# Patient Record
Sex: MALE | Race: WHITE | Employment: OTHER | URBAN - METROPOLITAN AREA
[De-identification: names, ages, dates, MRNs, and addresses within clinical notes are randomized per-mention and may not be internally consistent; named-entity substitution may affect disease eponyms.]

---

## 2017-07-28 ENCOUNTER — GENERIC CONVERSION - ENCOUNTER (OUTPATIENT)
Dept: OTHER | Facility: OTHER | Age: 72
End: 2017-07-28

## 2017-08-03 ENCOUNTER — ALLSCRIPTS OFFICE VISIT (OUTPATIENT)
Dept: RADIOLOGY | Facility: CLINIC | Age: 72
End: 2017-08-03
Payer: MEDICARE

## 2017-08-07 ENCOUNTER — GENERIC CONVERSION - ENCOUNTER (OUTPATIENT)
Dept: OTHER | Facility: OTHER | Age: 72
End: 2017-08-07

## 2017-09-26 ENCOUNTER — GENERIC CONVERSION - ENCOUNTER (OUTPATIENT)
Dept: OTHER | Facility: OTHER | Age: 72
End: 2017-09-26

## 2017-10-05 ENCOUNTER — ALLSCRIPTS OFFICE VISIT (OUTPATIENT)
Dept: RADIOLOGY | Facility: CLINIC | Age: 72
End: 2017-10-05
Payer: MEDICARE

## 2017-10-05 ENCOUNTER — APPOINTMENT (OUTPATIENT)
Dept: RADIOLOGY | Facility: CLINIC | Age: 72
End: 2017-10-05
Payer: MEDICARE

## 2017-10-05 DIAGNOSIS — M54.50 LOW BACK PAIN: ICD-10-CM

## 2017-10-05 DIAGNOSIS — M79.604 PAIN OF RIGHT LEG: ICD-10-CM

## 2017-10-05 DIAGNOSIS — M79.605 PAIN OF LEFT LEG: ICD-10-CM

## 2017-10-05 PROCEDURE — 73503 X-RAY EXAM HIP UNI 4/> VIEWS: CPT

## 2017-10-05 PROCEDURE — 72110 X-RAY EXAM L-2 SPINE 4/>VWS: CPT

## 2017-11-09 ENCOUNTER — GENERIC CONVERSION - ENCOUNTER (OUTPATIENT)
Dept: OTHER | Facility: OTHER | Age: 72
End: 2017-11-09

## 2017-11-09 ENCOUNTER — APPOINTMENT (OUTPATIENT)
Dept: RADIOLOGY | Facility: CLINIC | Age: 72
End: 2017-11-09
Payer: MEDICARE

## 2017-11-09 DIAGNOSIS — M54.50 LOW BACK PAIN: ICD-10-CM

## 2017-11-09 PROCEDURE — 72114 X-RAY EXAM L-S SPINE BENDING: CPT

## 2017-11-27 ENCOUNTER — GENERIC CONVERSION - ENCOUNTER (OUTPATIENT)
Dept: OTHER | Facility: OTHER | Age: 72
End: 2017-11-27

## 2017-12-06 ENCOUNTER — GENERIC CONVERSION - ENCOUNTER (OUTPATIENT)
Dept: OTHER | Facility: OTHER | Age: 72
End: 2017-12-06

## 2017-12-08 ENCOUNTER — GENERIC CONVERSION - ENCOUNTER (OUTPATIENT)
Dept: PAIN MEDICINE | Facility: CLINIC | Age: 72
End: 2017-12-08

## 2017-12-13 ENCOUNTER — GENERIC CONVERSION - ENCOUNTER (OUTPATIENT)
Dept: OTHER | Facility: OTHER | Age: 72
End: 2017-12-13

## 2017-12-14 ENCOUNTER — GENERIC CONVERSION - ENCOUNTER (OUTPATIENT)
Dept: OTHER | Facility: OTHER | Age: 72
End: 2017-12-14

## 2017-12-21 ENCOUNTER — ALLSCRIPTS OFFICE VISIT (OUTPATIENT)
Dept: RADIOLOGY | Facility: CLINIC | Age: 72
End: 2017-12-21
Payer: MEDICARE

## 2017-12-28 ENCOUNTER — GENERIC CONVERSION - ENCOUNTER (OUTPATIENT)
Dept: OTHER | Facility: OTHER | Age: 72
End: 2017-12-28

## 2018-01-10 NOTE — MISCELLANEOUS
Message   Recorded as Task   Date: 08/07/2017 09:07 AM, Created By: Freeman García   Task Name: Follow Up   Assigned To: [facility] [place] [facility]   Regarding Patient: Dexter Patel, Status: Active   Comment:    Marck Gaytan - 07 Aug 2017 9:07 AM     TASK CREATED  s/p LESI - asa plavix on 8/3  Per SL, repeat procedure in ~ 2 weeks  Fu w/ DG 2 weeks later  Anticoag hold signed by Dr Albert Patten on 7/27  ***Call Dr Albert Patten to confirm - ok for repeat anticoag hold   Chastity Milian - 07 Aug 2017 12:21 PM     TASK EDITED  Pt called requesting form to be faxed to Dr Mayi Coehn office  He would like injection to be scheduled before Dr Juanis Jim is on vacation  Pls call pt on cell # [phone]  Marck Gaytan - 07 Aug 2017 2:56 PM     TASK EDITED  s/w pt, requesting an injecton on 8/17, "before Dr Juanis Jim goes on vacation " Advised pt, Dr Juanis Jim does not have hours 8/14 - 8/21  Advised pt, procedure on 8/3 does not allow enough time to resume and hold plavix again for an injection before Dr Juanis Jim goes on vacation on 8/14  Pt verbalized understanding and appreciation for the call  Active Problems    1  Carpal tunnel syndrome (354 0) (G56 00)   2  Chronic lumbar pain (724 2,338 29) (M54 5,G89 29)   3  Chronic lumbar radiculopathy (724 4) (M54 16)   4  Leg pain, bilateral (729 5) (M79 604,M79 605)   5  Lumbar herniated disc (722 10) (M51 26)   6  Lumbar spondylosis (721 3) (M47 816)   7  Pain syndrome, chronic (338 4) (G89 4)   8  Polymyalgia rheumatica (725) (M35 3)   9  Wrist pain, right (719 43) (M25 531)    Current Meds   1  Aleve CAPS; Therapy: (Recorded:94Mve5607) to Recorded   2  Aspir-81 81 MG Oral Tablet Delayed Release Recorded   3  Atenolol 50 MG Oral Tablet; Therapy: (Recorded:46Pvl5498) to Recorded   4  Calcium 250 MG Oral Capsule; Therapy: 72SRC4223 to Recorded   5  Co Q10 CAPS Recorded   6  Fluticasone Propionate 50 MCG/ACT Nasal Suspension;    Therapy: 13DOF3794 to (Evaluate:42Wlr3676) Recorded   7  Folic Acid 1 MG Oral Tablet Recorded   8  Gabapentin 100 MG Oral Capsule; Take 1 po tid x 7 days, then 2 po tid x 7 days, then   3 po tid; Therapy: 86DLZ0843 to (Evaluate:28Oct2016)  Requested for: 10YPH4174; Last   Rx:28Sep2016 Ordered   9  Hydrocodone-Acetaminophen 7 5-325 MG Oral Tablet; TAKE 1 TABLET TWICE DAILY   AS NEEDED FOR PAIN;   Therapy: 15AFT1963 to (Evaluate:01Oct2015); Last Rx:01Sep2015 Ordered   10  Livalo 4 MG Oral Tablet; Therapy: (Recorded:81Kcw3796) to Recorded   11  Magnesium 200 MG Oral Tablet; Therapy: 91HYO3273 to Recorded   12  Multi-Vitamin/Iron Oral Tablet; Therapy: 50IAY2757 to Recorded   13  Omeprazole 20 MG Oral Capsule Delayed Release; Therapy: 49HIU3656 to (566 324 313) Recorded   14  Plavix 75 MG Oral Tablet (Clopidogrel Bisulfate) Recorded   15  Ramipril 5 MG Oral Capsule; Therapy: 45ASY4689 to (566 324 313) Recorded   16  RaNITidine HCl - 300 MG Oral Capsule; Therapy: (Recorded:24Bwg5860) to Recorded   17  Synthroid 150 MCG Oral Tablet (Levothyroxine Sodium); Therapy: (Recorded:95Sbc0713) to Recorded   18  Tylenol 325 MG Oral Tablet; Therapy: (Recorded:33Ebg0217) to Recorded   19  Viagra 50 MG Oral Tablet; Therapy: 84Jwk2435 to (Evaluate:47Iuz6902) Recorded   20  Zinc 100 MG Oral Tablet; Therapy: 13RCY1069 to Recorded    Allergies    1  Cardizem TABS   2   Niacin TABS   3  Statins    Signatures   Electronically signed by : Jonathon Franz, ; Aug  7 2017  2:56PM EST                       (Author)

## 2018-01-11 NOTE — MISCELLANEOUS
Message   Recorded as Task   Date: 09/21/2017 01:45 PM, Created By: Marylen Bongo   Task Name: Care Coordination   Assigned To: [facility] [place] [facility]   Regarding Patient: Dontae Luis, Status: Active   Comment:    MartinezLuz rodriguez - 21 Sep 2017 1:45 PM     TASK CREATED  faxed new hold form, pt back from vac    Dr South Amin (plavix & asa)    schedule for Marck Winn - 26 Sep 2017 10:03 AM     TASK EDITED  Received ok to hold plavis x 7 days prior to procedure and asa x 6 days prior to procedure signed by Dr South Amin 9/25/17  Marck Gaytan - 26 Sep 2017 10:52 AM     TASK EDITED  s/w pt, advised of above  Scheduled LESI - Plavix & ASA on 10/5  Reviewed pre procedure instructions: eat a light meal - npo 1 hour prior, , loose fitting clothing, cb if illness or abx start prior to procedure, no asa 9/29 - 10/5 post procedure, no plavix 9/28 - 10/5 post procedure  Pt states he takes occassional aleve  Advised pt, no NSAIDS 10/1 - 10/5 post procedure, tylenol is ok  ***Pt states he is currently at the "tail end" of bronchitis  Often gets bronchitis w/ the change of seasons  No abx, confirmed short course of oral steroids  Advised pt, he should be asymptomatic prior to procedure; no fever, productive cough, discolored mucous  Will discuss oral steroid w/ SL - anticipate it will not be an issue as it should be completed prior to procedure  pt verbalized understanding and appreciation  Hany Nciholson - 26 Sep 2017 11:47 AM     TASK REPLIED TO: Previously Assigned To Hany Nicholson and aware        Active Problems    1  Carpal tunnel syndrome (354 0) (G56 00)   2  Chronic lumbar pain (724 2,338 29) (M54 5,G89 29)   3  Chronic lumbar radiculopathy (724 4) (M54 16)   4  Leg pain, bilateral (729 5) (M79 604,M79 605)   5  Lumbar herniated disc (722 10) (M51 26)   6  Lumbar spondylosis (721 3) (M47 816)   7  Pain syndrome, chronic (338 4) (G89 4)   8  Polymyalgia rheumatica (725) (M35 3)   9   Wrist pain, right (619 43) (L66 821)    Current Meds   1  Aleve CAPS; Therapy: (Recorded:10Gvx4429) to Recorded   2  Aspir-81 81 MG Oral Tablet Delayed Release Recorded   3  Atenolol 50 MG Oral Tablet; Therapy: (Recorded:19May2015) to Recorded   4  Calcium 250 MG Oral Capsule; Therapy: 61EZK3990 to Recorded   5  Co Q10 CAPS Recorded   6  Fluticasone Propionate 50 MCG/ACT Nasal Suspension; Therapy: 74LPT9059 to (Evaluate:05Aug2015) Recorded   7  Folic Acid 1 MG Oral Tablet Recorded   8  Gabapentin 100 MG Oral Capsule; Take 1 po tid x 7 days, then 2 po tid x 7 days, then   3 po tid; Therapy: 24ZSB8884 to (Evaluate:28Oct2016)  Requested for: 08GXX9993; Last   Rx:28Sep2016 Ordered   9  Hydrocodone-Acetaminophen 7 5-325 MG Oral Tablet; TAKE 1 TABLET TWICE DAILY   AS NEEDED FOR PAIN;   Therapy: 64IAS9988 to (Evaluate:01Oct2015); Last Rx:01Sep2015 Ordered   10  Livalo 4 MG Oral Tablet; Therapy: (Recorded:19May2015) to Recorded   11  Magnesium 200 MG Oral Tablet; Therapy: 65CHB3800 to Recorded   12  Multi-Vitamin/Iron Oral Tablet; Therapy: 60SXS9227 to Recorded   13  Omeprazole 20 MG Oral Capsule Delayed Release; Therapy: 16NAN1572 to (77 873 135) Recorded   14  Plavix 75 MG Oral Tablet (Clopidogrel Bisulfate) Recorded   15  Ramipril 5 MG Oral Capsule; Therapy: 45AGH6202 to (77 873 135) Recorded   16  RaNITidine HCl - 300 MG Oral Capsule; Therapy: (Recorded:19May2015) to Recorded   17  Synthroid 150 MCG Oral Tablet (Levothyroxine Sodium); Therapy: (Recorded:19May2015) to Recorded   18  Tylenol 325 MG Oral Tablet; Therapy: (Recorded:19May2015) to Recorded   19  Viagra 50 MG Oral Tablet; Therapy: 38Lox6461 to (Evaluate:05Aug2015) Recorded   20  Zinc 100 MG Oral Tablet; Therapy: 89HPI0485 to Recorded    Allergies    1  Cardizem TABS   2   Niacin TABS   3  Statins    Signatures   Electronically signed by : Chiki Suazo, ; Sep 26 2017 12:06PM EST                       (Author)

## 2018-01-11 NOTE — MISCELLANEOUS
Message  Patient called requesting MR for 11/9 Xray  Mailed report to patient      Active Problems    1  Carpal tunnel syndrome (354 0) (G56 00)   2  Chronic lumbar pain (724 2,338 29) (M54 5,G89 29)   3  Chronic lumbar radiculopathy (724 4) (M54 16)   4  Leg pain, bilateral (729 5) (M79 604,M79 605)   5  Lumbar herniated disc (722 10) (M51 26)   6  Lumbar spondylosis (721 3) (M47 816)   7  Pain syndrome, chronic (338 4) (G89 4)   8  Polymyalgia rheumatica (725) (M35 3)   9  Wrist pain, right (719 43) (M25 531)    Current Meds   1  Aleve CAPS; Therapy: (Recorded:19May2015) to Recorded   2  Aspir-81 81 MG Oral Tablet Delayed Release Recorded   3  Atenolol 50 MG Oral Tablet; Therapy: (Recorded:19May2015) to Recorded   4  Calcium 250 MG Oral Capsule; Therapy: 04LAJ5103 to Recorded   5  Co Q10 CAPS Recorded   6  Fluticasone Propionate 50 MCG/ACT Nasal Suspension; Therapy: 33KKN7014 to (Evaluate:31Fth7857) Recorded   7  Folic Acid 1 MG Oral Tablet Recorded   8  Livalo 4 MG Oral Tablet; Therapy: (Recorded:19May2015) to Recorded   9  Magnesium 200 MG Oral Tablet; Therapy: 06VIA0912 to Recorded   10  Multi-Vitamin/Iron Oral Tablet; Therapy: 40CNQ8070 to Recorded   11  Omeprazole 20 MG Oral Capsule Delayed Release; Therapy: 66XVD0692 to (566 324 313) Recorded   12  Plavix 75 MG Oral Tablet (Clopidogrel Bisulfate) Recorded   13  Ramipril 5 MG Oral Capsule; Therapy: 05PBC2763 to (566 324 313) Recorded   14  RaNITidine HCl - 300 MG Oral Capsule; Therapy: (Recorded:19May2015) to Recorded   15  Synthroid 150 MCG Oral Tablet (Levothyroxine Sodium); Therapy: (Recorded:19May2015) to Recorded   16  Tylenol 325 MG Oral Tablet; Therapy: (Recorded:19May2015) to Recorded   17  Viagra 50 MG Oral Tablet; Therapy: 84Swp2163 to (Evaluate:88Mhv8945) Recorded   18  Zinc 100 MG Oral Tablet; Therapy: 25QSX3643 to Recorded    Allergies    1  Cardizem TABS   2  Niacin TABS   3  Statins    Signatures   Electronically signed by : Lupis Mi, ; Nov 27 2017 10:52AM EST                       (Author)

## 2018-01-12 NOTE — MISCELLANEOUS
Message   Recorded as Task   Date: 11/08/2016 09:59 AM, Created By: Bhavin John   Task Name: Miscellaneous   Assigned To: Bhavin John   Regarding Patient: Ruddy Etienne, Status: Active   CommentToma Lopeno - 08 Nov 2016 9:59 AM     TASK CREATED  Patient missed his appt  11/8/16 @ 9:15 AM with Barrie  I called the patient & he stated that he cancelled this appt  He gave no reason for the appt  & did not reschedule  Barrie Ramirez - 08 Nov 2016 10:26 AM     TASK REPLIED TO: Previously Assigned To Barrie Ramirez  Provider aware  Thank you  Active Problems    1  Carpal tunnel syndrome (354 0) (G56 00)   2  Chronic lumbar pain (724 2,338 29) (M54 5,G89 29)   3  Chronic lumbar radiculopathy (724 4) (M54 16)   4  Leg pain, bilateral (729 5) (M79 604,M79 605)   5  Lumbar herniated disc (722 10) (M51 26)   6  Lumbar spondylosis (721 3) (M47 816)   7  Pain syndrome, chronic (338 4) (G89 4)   8  Polymyalgia rheumatica (725) (M35 3)   9  Wrist pain, right (719 43) (M25 531)    Current Meds   1  Aleve CAPS; Therapy: (Recorded:19May2015) to Recorded   2  Aspir-81 81 MG Oral Tablet Delayed Release Recorded   3  Atenolol 50 MG Oral Tablet; Therapy: (Recorded:18Asq1358) to Recorded   4  Co Q10 CAPS Recorded   5  Fluticasone Propionate 50 MCG/ACT Nasal Suspension; Therapy: 02DWG5002 to (Evaluate:13Rht2967) Recorded   6  Folic Acid 1 MG Oral Tablet Recorded   7  Gabapentin 100 MG Oral Capsule; Take 1 po tid x 7 days, then 2 po tid x 7 days, then   3 po tid; Therapy: 55RNI6319 to (Evaluate:28Oct2016)  Requested for: 86NQM0200; Last   Rx:28Sep2016 Ordered   8  Hydrocodone-Acetaminophen 7 5-325 MG Oral Tablet; TAKE 1 TABLET TWICE DAILY   AS NEEDED FOR PAIN;   Therapy: 43REQ5079 to (Evaluate:01Oct2015); Last Rx:01Sep2015 Ordered   9  Livalo 4 MG Oral Tablet; Therapy: (Recorded:85Vrh8490) to Recorded   10  Methotrexate 2 5 MG Oral Tablet Recorded   11  Omeprazole 20 MG Oral Capsule Delayed Release;     Therapy: 60ZLT4771 to (05 12 73 93 30) Recorded   12  Plavix 75 MG Oral Tablet (Clopidogrel Bisulfate) Recorded   13  PredniSONE 1 MG Oral Tablet Recorded   14  Ramipril 5 MG Oral Capsule; Therapy: 52RMP1855 to (05 12 73 93 30) Recorded   15  RaNITidine HCl - 300 MG Oral Capsule; Therapy: (Recorded:79Msz3941) to Recorded   16  Synthroid 150 MCG Oral Tablet (Levothyroxine Sodium); Therapy: (Recorded:11Prj0493) to Recorded   17  Tylenol 325 MG Oral Tablet; Therapy: (Recorded:34Dwo3641) to Recorded   18  Viagra 50 MG Oral Tablet; Therapy: 62Glb0678 to (Evaluate:30Rwd7406) Recorded    Allergies    1  Cardizem TABS   2  Niacin TABS   3  Statins    Signatures   Electronically signed by :  Penny Beard, ; Nov 8 2016 10:53AM EST                       (Author)

## 2018-01-17 NOTE — RESULT NOTES
Message   Recorded as Task   Date: 10/06/2017 02:17 PM, Created By: [facility]   Task Name: Follow Up   Assigned To: [facility]   Regarding Patient: Xi Alonso, Status: Active   CommentVelia Plane - 06 Oct 2017 2:17 PM     TASK CREATED  S/P LESI on 10/05/2017 w/[facility] [place]  4W F/U AFTER INJ & MRI scheduled on 11/03/2017    Please call on 10/12/2017   Margarita Flores - 17 Oct 2017 9:21 AM     TASK EDITED  spoke to pt he states he received 50% relief from inj and current pain is 4/10   Hany Nicholson - 17 Oct 2017 7:16 PM     TASK REPLIED TO: Previously Assigned To Hany Nicholson  aware

## 2018-01-17 NOTE — MISCELLANEOUS
Message   Recorded as Task   Date: 10/31/2016 03:57 PM, Created By: Eugenia Brewster   Task Name: Miscellaneous   Assigned To: Eugenia Brewsetr   Regarding Patient: Suzy Mcmillan, Status: Active   CommentEarna Prim - 31 Oct 2016 3:57 PM     TASK CREATED  Patient's wife called to cancel his appt/ 11/8/16 @ 9:15 AM with Barrie  She gave no reason for the cancellation, but stated that she would call back to Barrie Kate - 31 Oct 2016 4:20 PM     TASK REPLIED TO: Previously Assigned To Barrie Ramirez  Provide aware  Thank you  Active Problems    1  Carpal tunnel syndrome (354 0) (G56 00)   2  Chronic lumbar pain (724 2,338 29) (M54 5,G89 29)   3  Chronic lumbar radiculopathy (724 4) (M54 16)   4  Leg pain, bilateral (729 5) (M79 604,M79 605)   5  Lumbar herniated disc (722 10) (M51 26)   6  Lumbar spondylosis (721 3) (M47 816)   7  Pain syndrome, chronic (338 4) (G89 4)   8  Polymyalgia rheumatica (725) (M35 3)   9  Wrist pain, right (719 43) (M25 531)    Current Meds   1  Aleve CAPS; Therapy: (Recorded:24Xoc7362) to Recorded   2  Aspir-81 81 MG Oral Tablet Delayed Release Recorded   3  Atenolol 50 MG Oral Tablet; Therapy: (Recorded:19Jrq3937) to Recorded   4  Co Q10 CAPS Recorded   5  Fluticasone Propionate 50 MCG/ACT Nasal Suspension; Therapy: 14IHC1498 to (Evaluate:81Hzp7733) Recorded   6  Folic Acid 1 MG Oral Tablet Recorded   7  Gabapentin 100 MG Oral Capsule; Take 1 po tid x 7 days, then 2 po tid x 7 days, then   3 po tid; Therapy: 09PDP7226 to (Evaluate:28Oct2016)  Requested for: 92PTK8516; Last   Rx:28Sep2016 Ordered   8  Hydrocodone-Acetaminophen 7 5-325 MG Oral Tablet; TAKE 1 TABLET TWICE DAILY   AS NEEDED FOR PAIN;   Therapy: 29QCU3677 to (Evaluate:01Oct2015); Last Rx:01Sep2015 Ordered   9  Livalo 4 MG Oral Tablet; Therapy: (Recorded:28Ctz6461) to Recorded   10  Methotrexate 2 5 MG Oral Tablet Recorded   11  Omeprazole 20 MG Oral Capsule Delayed Release;     Therapy: 65BMW1770 to ([phone]) Recorded   12  Plavix 75 MG Oral Tablet (Clopidogrel Bisulfate) Recorded   13  PredniSONE 1 MG Oral Tablet Recorded   14  Ramipril 5 MG Oral Capsule; Therapy: 64ZHN7092 to (229 1574 1026) Recorded   15  RaNITidine HCl - 300 MG Oral Capsule; Therapy: (Recorded:20Jtu7076) to Recorded   16  Synthroid 150 MCG Oral Tablet (Levothyroxine Sodium); Therapy: (Recorded:62Xbh0734) to Recorded   17  Tylenol 325 MG Oral Tablet; Therapy: (Recorded:96Cur3385) to Recorded   18  Viagra 50 MG Oral Tablet; Therapy: 03Qrn7313 to (Evaluate:79Eab3474) Recorded    Allergies    1  Cardizem TABS   2  Niacin TABS   3  Statins    Signatures   Electronically signed by :  Elizabeth Walker, ; Nov 1 2016  8:12AM EST                       (Author)

## 2018-01-18 NOTE — MISCELLANEOUS
Message   Recorded as Task   Date: 07/18/2017 04:29 PM, Created By: [address]   Task Name: Care Coordination   Assigned To: [facility] [place] [facility]   Regarding Patient: Caio Spring, Status: Active   Comment:    Marck Gaytan - 18 Jul 2017 4:29 PM     TASK CREATED  Per clerical staff, pt presented at the office today  states he felt good in june, came home from [place] and did some things at home and is now having pain in low back into b/l legs  S/w pt, Stated that he discussed injections w/ SL last year  Coudl not hold plavix at that time  Pt states that he has seen Dr Norm Ramos since then  ok to hold plavix for short periods  Pt states he has seen sports medicine in [place] for knee and shoulder pain  Had stem cell inj in knee w/ + relief  Per pt, Dr Rosales Andres in [place]  Pt also sees a pcp in [place], Dr Nicola Schwab  No changes in his health - basal cell removal      Updated pt's medication list  Per pt, stopped gabapentin  Has not needed hydrocodone acetaminophen  Pt stated that he has not had any issues w/ back pain  Came back to [place]  Feels he aggrivated his back pain w/ heavy lifting and working w/ machinery; tractors, mowers, etc  Pt c/o b/l low back pain and b/l posterior thigh pain possibly worse in R leg  Advised pt, will d/w Dr Cherelle Denney and cb to advise  Per GRISELDA WARD Manette - 18 Jul 2017 4:38 PM     TASK EDITED  order faxed to Dr Norm Ramos   ** Call pt to advise of Marck Heath - 19 Jul 2017 9:49 AM     TASK EDITED  Order refaxed to Dr Norm Ramos 7/19  Confirmation received  S/w pt, advised of above  Pt is aware, this office will cb when a response from Dr Norm Ramos is received  Laura Franz - 26 Jul 2017 11:55 AM     TASK EDITED  [facility]- patient called back stating he called Dr Darryl Rodarte office and they have yet to get the form/request to hold his Plavix   stated to fax form to [phone] Attn: Candice Sandoval   patient stated Dr Norm Ramos wants Dr Cherelle Denney to specify how many days he is requesting to have patient off of the Plavix   any questions to c/b [phone] (cell)   Marck Gaytan - 26 Jul 2017 2:08 PM     TASK EDITED  Refaxed anticoag form to Dr Sheryl Holcomb office  S/w Sabina in the PT room  advised of above  Per Shawna Rider, Dr Jesús De Anda will be in the office all day tomorrow, anticipate a reply at that time  S/w pt, advised of above  pt verbalized udnerstanding and appreciation  Will wait for our call  Katerin Melvin - 27 Jul 2017 1:24 PM     TASK EDITED  Shawna Rider retruning call and said that Ponce Valle signed the paper and she will be faxing it over so to be on the look out for it  any questions you can call her back or if you dont recieve it   Chastity Milian - 27 Jul 2017 2:51 PM     TASK EDITED  Pt called stating he talked to Dr Sheryl Holcomb office and they faxed the form for the OK for him to get inj  Pls call pt on cell # [phone]  Marck Gaytan - 27 Jul 2017 2:55 PM     TASK EDITED  Received ok to hold plavix x 7 days, asa x 6 days prior to procedure signed by Dr Jesús De Anda on 7/27   Marck Gaytan - 28 Jul 2017 10:33 AM     TASK EDITED  s/w pt, advised of above  pt confirmed last plavix on 7/27/2017  Scheduled LESI on 8/3  Reviewd pre procedure instructions; eat a light meal - npo 1 hour prior, , loose fitting clothing, cb if illness /abx start, no plavix 7/28 - 8/3 post procedure, no asa 7/29 - 8/3 post procedure  pt verbalized understanding and appreciation  Will cb prn  Active Problems    1  Carpal tunnel syndrome (354 0) (G56 00)   2  Chronic lumbar pain (724 2,338 29) (M54 5,G89 29)   3  Chronic lumbar radiculopathy (724 4) (M54 16)   4  Leg pain, bilateral (729 5) (M79 604,M79 605)   5  Lumbar herniated disc (722 10) (M51 26)   6  Lumbar spondylosis (721 3) (M47 816)   7  Pain syndrome, chronic (338 4) (G89 4)   8  Polymyalgia rheumatica (725) (M35 3)   9  Wrist pain, right (670 43) (M20 531)    Current Meds   1  Aleve CAPS;    Therapy: (Recorded:70Arn7981) to Recorded   2  Aspir-81 81 MG Oral Tablet Delayed Release Recorded   3  Atenolol 50 MG Oral Tablet; Therapy: (Recorded:93Tzc3186) to Recorded   4  Calcium 250 MG Oral Capsule; Therapy: 45BNI8666 to Recorded   5  Co Q10 CAPS Recorded   6  Fluticasone Propionate 50 MCG/ACT Nasal Suspension; Therapy: 36WSF6401 to (Evaluate:05Aug2015) Recorded   7  Folic Acid 1 MG Oral Tablet Recorded   8  Gabapentin 100 MG Oral Capsule; Take 1 po tid x 7 days, then 2 po tid x 7 days, then   3 po tid; Therapy: 45JAQ5974 to (Evaluate:28Oct2016)  Requested for: 94FDL3848; Last   Rx:28Sep2016 Ordered   9  Hydrocodone-Acetaminophen 7 5-325 MG Oral Tablet; TAKE 1 TABLET TWICE DAILY   AS NEEDED FOR PAIN;   Therapy: 91YGT1679 to (Evaluate:01Oct2015); Last Rx:01Sep2015 Ordered   10  Livalo 4 MG Oral Tablet; Therapy: (Recorded:19May2015) to Recorded   11  Magnesium 200 MG Oral Tablet; Therapy: 41UNH3618 to Recorded   12  Multi-Vitamin/Iron Oral Tablet; Therapy: 39AGJ5139 to Recorded   13  Omeprazole 20 MG Oral Capsule Delayed Release; Therapy: 65TUM2332 to ([phone]) Recorded   14  Plavix 75 MG Oral Tablet (Clopidogrel Bisulfate) Recorded   15  Ramipril 5 MG Oral Capsule; Therapy: 05JBP1880 to ([id number]) Recorded   16  RaNITidine HCl - 300 MG Oral Capsule; Therapy: (Recorded:25Ecw2441) to Recorded   17  Synthroid 150 MCG Oral Tablet (Levothyroxine Sodium); Therapy: (Recorded:40Ele9251) to Recorded   18  Tylenol 325 MG Oral Tablet; Therapy: (Recorded:59Cbc0333) to Recorded   19  Viagra 50 MG Oral Tablet; Therapy: 33Vhl1031 to (Evaluate:05Aug2015) Recorded   20  Zinc 100 MG Oral Tablet; Therapy: 62ZRR7820 to Recorded    Allergies    1  Cardizem TABS   2   Niacin TABS   3  Statins    Signatures   Electronically signed by : Drea Walker, ; Jul 28 2017 10:33AM EST                       (Author)

## 2018-01-22 VITALS
HEIGHT: 71 IN | BODY MASS INDEX: 28.7 KG/M2 | SYSTOLIC BLOOD PRESSURE: 120 MMHG | WEIGHT: 205 LBS | HEART RATE: 80 BPM | DIASTOLIC BLOOD PRESSURE: 64 MMHG | TEMPERATURE: 97.7 F

## 2018-01-23 NOTE — MISCELLANEOUS
Message   Recorded as Task   Date: 12/08/2017 09:29 AM, Created By: Myra Duval   Task Name: Hospital Call   Assigned To: [facility] [place] [facility]   Regarding Patient: Brando Oh, Status: Active   CommentChanning Gala - 08 Dec 2017 9:29 AM     TASK CREATED  Caller: Shaun Mckeon - ENDO ; Hospital Call; [phone]  Received a call from Shaun Mckeon RN from the Endo center  She just wanted to inform you per Dr Saige Hassan and Dr Sanches Counts that there is a high chance of cross contamination and the pt  is at a great risk of getting transient bacteremia  She stated the pt  is dehydrated d/t a bowel cleansing and they recommend to SL not have an the epidural done today  SL made aware and per Calvin Sanchez for today cancelled and pt  to be notified on monday to possibly be rescheduled after f/u c/ Dr Saieg Hassan as to when he can be cleared and to check date on consent for plavix hold  Attempted to call pt's cell phone on file and left a detailed mom in regards to cancelling opro for today and restarting plavix  Luz aware also  Thanks Melissa Soliman - 08 Dec 2017 9:42 AM     TASK EDITED  S/w Shaun Mckeon at [phone] to verify risk of contamination  She s/w Dr Federica Rowley at the [facility] and he stated he would not do the epidural for 24 hrs  The pt  needs to rehydrate himself  Just Bret Gonzalez thanks   Hany Nicholson - 08 Dec 2017 9:47 AM     TASK REPLIED TO: Previously Assigned To Hany Nicholson Stacey - 08 Dec 2017 10:01 AM     TASK EDITED  New Anticoagulant hold faxed to Dr Bonnie Love for urgent response  Need to verify that it is ok for the pt  to stop the plavix again so soon after the colonoscopy  Will await response  Just [id number] [address]  thanks   Hany Nicholson - 08 Dec 2017 10:06 AM     TASK REPLIED TO: Previously Assigned To Hany Nicholson Stacey - 08 Dec 2017 10:08 AM     TASK EDITED  Attempted to call house number and left a detailed mom in regards to cancelling the procedure today and restarting the plavix   Pt  to CB to f/u  Rebekah Ramos - 08 Dec 2017 10:08 AM     TASK IN PROGRESS   RichardRebekah child - 08 Dec 2017 3:46 PM     TASK EDITED  Pt  showed up at the office and I explained the reasoning behind cancelling his [address]  Pt  verbalized frustration and did not fully understand the cancellation  Will await Dr Shaquille Kerr approval    Paula Comer - 11 Dec 2017 3:11 PM     TASK EDITED  Mychal Aisha from [facility] called requesting to talk to the nurse regarding holding the patient's plavix  Pls call Sabina on back line at [phone]  Marck Gaytan - 11 Dec 2017 3:22 PM     TASK EDITED  s/w sabina at [address]  Per dr Nuno Rios, ok to hold plavix after resuming x 1 week  Per sabina's request, refaxed anticoag hold for Dr Harshad Man to sign and review  Per Qiana Man will sign next week, anticipate pt will be scheduled after helen which will be good for anticoagulation  Will await Dr Shaquille Kerr response  Marck Gaytan - 11 Dec 2017 3:23 PM     TASK IN PROGRESS   Marck Gaytan - 13 Dec 2017 12:51 PM     TASK EDITED  Per SL, next procedure should be R L4-L5 TFESI   Chastity Milian - 14 Dec 2017 11:42 AM     TASK EDITED  Pt returned call and can be reached at [phone]  Marck Gaytan - 14 Dec 2017 1:05 PM     TASK EDITED  Received ok to hold plavix x 7 days prior to procedure signed by Dr Harshad Man 12/12/2017  ok to schedule 24 hrs s/p colonoscopy on 12/8 per Marck Castro - 14 Dec 2017 2:09 PM     TASK EDITED  S/w pt, advised of above  Pt verbalized understanding and apologized for getting upset last week at his cancelled procedure  Accepted pt's apology, emotional support provided  Schedule R L4-L5 TFESI on 12/21  Reviewed pre procedure instructions; eat a light meal - npo 1 hour prior, , loose fitting clothing, cb if illness / abx start prior to procedure, no plavix starting today, 12/14 - 12/21/2017 post procedure, ok to continue asa  Pt verbalized understanding and appreciation  Active Problems    1  Carpal tunnel syndrome (354 0) (G56 00)   2  Chronic lumbar pain (724 2,338 29) (M54 5,G89 29)   3  Chronic lumbar radiculopathy (724 4) (M54 16)   4  Leg pain, bilateral (729 5) (M79 604,M79 605)   5  Lumbar herniated disc (722 10) (M51 26)   6  Lumbar spondylosis (721 3) (M47 816)   7  Pain syndrome, chronic (338 4) (G89 4)   8  Polymyalgia rheumatica (725) (M35 3)   9  Wrist pain, right (719 43) (M25 531)    Current Meds   1  Aleve CAPS; Therapy: (Recorded:19May2015) to Recorded   2  Aspir-81 81 MG Oral Tablet Delayed Release Recorded   3  Atenolol 50 MG Oral Tablet; Therapy: (Recorded:19May2015) to Recorded   4  Calcium 250 MG Oral Capsule; Therapy: 15LFY5651 to Recorded   5  Co Q10 CAPS Recorded   6  Fluticasone Propionate 50 MCG/ACT Nasal Suspension; Therapy: 76DLS1946 to (Evaluate:40Jpk5854) Recorded   7  Folic Acid 1 MG Oral Tablet Recorded   8  Livalo 4 MG Oral Tablet; Therapy: (Recorded:19May2015) to Recorded   9  Magnesium 200 MG Oral Tablet; Therapy: 72BYH2231 to Recorded   10  Multi-Vitamin/Iron Oral Tablet; Therapy: 77MIF2707 to Recorded   11  Omeprazole 20 MG Oral Capsule Delayed Release; Therapy: 51KTM3715 to [phone]) Recorded   12  Plavix 75 MG Oral Tablet (Clopidogrel Bisulfate) Recorded   13  Ramipril 5 MG Oral Capsule; Therapy: 38UQS1290 to [phone]) Recorded   14  RaNITidine HCl - 300 MG Oral Capsule; Therapy: (Recorded:19May2015) to Recorded   15  Synthroid 150 MCG Oral Tablet (Levothyroxine Sodium); Therapy: (Recorded:19May2015) to Recorded   16  Tylenol 325 MG Oral Tablet; Therapy: (Recorded:19May2015) to Recorded   17  Viagra 50 MG Oral Tablet; Therapy: 49Awe6903 to (Evaluate:44Uyr4557) Recorded   18  Zinc 100 MG Oral Tablet; Therapy: 65CWG6078 to Recorded    Allergies    1  Cardizem TABS   2   Niacin TABS   3  Statins    Signatures   Electronically signed by : Anselmo Lucas, ; Dec 14 2017  2:10PM EST (Author)

## 2018-01-23 NOTE — MISCELLANEOUS
Message   Recorded as Task   Date: 12/28/2017 10:08 AM, Created By: David Alegre   Task Name: Follow Up   Assigned To: [facility] [place] [facility]   Regarding Patient: Mell Mix, Status: Active   CommentGrey Padilla - 28 Dec 2017 10:08 AM     TASK CREATED  S/P R L5 TFESI on 12/21/2017 w/SL Annetta Butterfieldt  No f/u scheduled     Please call on 12/28/2017   David Alegre - 28 Dec 2017 10:11 AM     TASK EDITED  Patient states he has less pain in his leg when driving  Patient got 50% relief and pain level is 2/10  No f/u scheduled  Hany Nicholson - 28 Dec 2017 10:14 AM     TASK REASSIGNED: Previously Assigned To aHny Nicholson  Does he have a follow-up with his surgeon? Marck Gaytan - 28 Dec 2017 3:46 PM     TASK EDITED  s/w pt, states he does not have an appt scheduled at this time, but does have plans to fu w/ his surgeon  Advised pt to cb w/ any questions or concerns  Hany Nicholson - 28 Dec 2017 4:08 PM     TASK REPLIED TO: Previously Assigned To Hany Nicholson  aware        Active Problems    1  Carpal tunnel syndrome (354 0) (G56 00)   2  Chronic lumbar pain (724 2,338 29) (M54 5,G89 29)   3  Chronic lumbar radiculopathy (724 4) (M54 16)   4  Leg pain, bilateral (729 5) (M79 604,M79 605)   5  Lumbar herniated disc (722 10) (M51 26)   6  Lumbar spondylosis (721 3) (M47 816)   7  Pain syndrome, chronic (338 4) (G89 4)   8  Polymyalgia rheumatica (725) (M35 3)   9  Wrist pain, right (719 43) (M25 531)    Current Meds   1  Aleve CAPS; Therapy: (Recorded:95Kvw9038) to Recorded   2  Aspir-81 81 MG Oral Tablet Delayed Release Recorded   3  Atenolol 50 MG Oral Tablet; Therapy: (Recorded:02Yoz6816) to Recorded   4  Calcium 250 MG Oral Capsule; Therapy: 91MQP0969 to Recorded   5  Co Q10 CAPS Recorded   6  Fluticasone Propionate 50 MCG/ACT Nasal Suspension; Therapy: 41VLS3955 to (Evaluate:59Jhi3278) Recorded   7  Folic Acid 1 MG Oral Tablet Recorded   8  Livalo 4 MG Oral Tablet; Therapy: (Recorded:88Byl7961) to Recorded   9  Magnesium 200 MG Oral Tablet; Therapy: 49EPD9652 to Recorded   10  Multi-Vitamin/Iron Oral Tablet; Therapy: 79GFE3385 to Recorded   11  Omeprazole 20 MG Oral Capsule Delayed Release; Therapy: 44WWB2830 to (Samson Drop) Recorded   12  Plavix 75 MG Oral Tablet (Clopidogrel Bisulfate) Recorded   13  Ramipril 5 MG Oral Capsule; Therapy: 81AHP6990 to (Samson Drop) Recorded   14  RaNITidine HCl - 300 MG Oral Capsule; Therapy: (Recorded:76Zng8358) to Recorded   15  Synthroid 150 MCG Oral Tablet (Levothyroxine Sodium); Therapy: (Recorded:30Wyx7102) to Recorded   16  Tylenol 325 MG Oral Tablet; Therapy: (Recorded:63Rcx4195) to Recorded   17  Viagra 50 MG Oral Tablet (Sildenafil Citrate); Therapy: 75Nyl2419 to (Evaluate:46Jai2219) Recorded   18  Zinc 100 MG Oral Tablet; Therapy: 93WXC1528 to Recorded    Allergies    1  Cardizem TABS   2   Niacin TABS   3  Statins    Signatures   Electronically signed by : Taylor Smith, ; Dec 28 2017  4:10PM EST                       (Author)

## 2018-01-23 NOTE — MISCELLANEOUS
Message   Recorded as Task   Date: 12/13/2017 10:49 AM, Created By: Mayra Sanchez   Task Name: Care Coordination   Assigned To: [facility] [place] [facility]   Regarding Patient: Erasmo Boykin, Status: Active   Comment:    Hany Nicholson - 13 Dec 2017 10:49 AM     TASK CREATED  his next procedure should be a right L4/5 TFESI   Luz Martinez - 13 Dec 2017 11:00 AM     TASK REASSIGNED: Previously Assigned To Luz Martinez  pt is on Plavix   Marck Gaytan - 13 Dec 2017 12:51 PM     TASK EDITED  addressed in lauren task        Active Problems    1  Carpal tunnel syndrome (354 0) (G56 00)   2  Chronic lumbar pain (724 2,338 29) (M54 5,G89 29)   3  Chronic lumbar radiculopathy (724 4) (M54 16)   4  Leg pain, bilateral (729 5) (M79 604,M79 605)   5  Lumbar herniated disc (722 10) (M51 26)   6  Lumbar spondylosis (721 3) (M47 816)   7  Pain syndrome, chronic (338 4) (G89 4)   8  Polymyalgia rheumatica (725) (M35 3)   9  Wrist pain, right (719 43) (M25 531)    Current Meds   1  Aleve CAPS; Therapy: (Recorded:41Eni4876) to Recorded   2  Aspir-81 81 MG Oral Tablet Delayed Release Recorded   3  Atenolol 50 MG Oral Tablet; Therapy: (Recorded:89Cml8985) to Recorded   4  Calcium 250 MG Oral Capsule; Therapy: 10YPP3622 to Recorded   5  Co Q10 CAPS Recorded   6  Fluticasone Propionate 50 MCG/ACT Nasal Suspension; Therapy: 37ZUY5891 to (Evaluate:83Afw0295) Recorded   7  Folic Acid 1 MG Oral Tablet Recorded   8  Livalo 4 MG Oral Tablet; Therapy: (Recorded:57Bni7120) to Recorded   9  Magnesium 200 MG Oral Tablet; Therapy: 80DLZ0885 to Recorded   10  Multi-Vitamin/Iron Oral Tablet; Therapy: 41LEV5832 to Recorded   11  Omeprazole 20 MG Oral Capsule Delayed Release; Therapy: 22SJG3002 to [phone]) Recorded   12  Plavix 75 MG Oral Tablet (Clopidogrel Bisulfate) Recorded   13  Ramipril 5 MG Oral Capsule; Therapy: 08UJN6531 to [phone]) Recorded   14  RaNITidine HCl - 300 MG Oral Capsule;     Therapy: (Recorded:95Ddo5864) to Recorded   15  Synthroid 150 MCG Oral Tablet (Levothyroxine Sodium); Therapy: (Recorded:18Vwg6231) to Recorded   16  Tylenol 325 MG Oral Tablet; Therapy: (Recorded:19May2015) to Recorded   17  Viagra 50 MG Oral Tablet; Therapy: 74Mlq6978 to (Evaluate:45Aid3605) Recorded   18  Zinc 100 MG Oral Tablet; Therapy: 62CDE2170 to Recorded    Allergies    1  Cardizem TABS   2   Niacin TABS   3  Statins    Signatures   Electronically signed by : Jackeline Mackenzie, ; Dec 13 2017 12:51PM EST                       (Author)

## 2018-01-23 NOTE — MISCELLANEOUS
Message   Recorded as Task   Date: 12/01/2017 12:07 PM, Created By: Rafael Mcgraw   Task Name: Miscellaneous   Assigned To: [facility] [place] [facility],Team   Regarding Patient: Ricardo Mccullough, Status: Active   Comment:    Luz Martinez - 01 Dec 2017 12:07 PM     TASK CREATED  pt l/m on  line states he was seen by Dr Katie Irby and referred to Dr Rachael Corley? and Dr Fili Almanza referred pt back to [facility] for an epidural      Pt states he recently had a colonoscopy and has been off his plavix and would like to come in on Friday 12/8/2017 but not sure what the injection if any would be could someone see if Dr Fili Almanza sent over notes or reach out to pt to gather more info  No hold form sent anywhere yet in regards to the plavix  Marck Gaytan - 01 Dec 2017 3:30 PM     TASK EDITED  LMOM to cb on home/ cell  Provided cb  number and office hours  Luz Martinez - 04 Dec 2017 9:12 AM     TASK EDITED  pt called back, advised he will need to discuss with the triage nurse  pt states he saw Dr Adina Glaser and will be faxing a note over  Marck Gaytan - 04 Dec 2017 10:19 AM     TASK EDITED  orders from Dr Adina Glaser are on your desk for review  Pt states he s/w Dr Adina Glaser who advised that if the pt has + relief w/ procedure ordered, Foraminal injection, L5-S1 Right in L5 Root, that is a good indicator and will proceed w/ surgery  Pt states his last dose of plavix and asa was thursday, 11/30 s/t colonoscopy scheduled on 12/8 AM  Pt is hoping to schedule the procedure w/ dr Lacey Salas on 12/8 to avoid repeated anticoag holds  Also, pt stated that his wife is having surgery soon and he will need to take care of her / will have limited time and availablity  Pt requested procedure on 12/8 again  Advised pt, will d/w Dr Katie Irby and cb to advise     Hany Nicholson - 04 Dec 2017 10:56 AM     TASK REPLIED TO: Previously Assigned To Hany Nicholson  ok to schedule right l5 with diary   Marck Gaytan - 04 Dec 2017 2:08 PM     TASK EDITED  S/w pt confirmed last dose of plavix on 11/29  No plavix or asa 11/30 - 12/8 post colonoscopy on 12/8 as discussed by Dr Tommy Del Rosario and Dr Yari Rico  Scheduled R L5 TFESI - pain diary - plavix, asa on 12/8  Reviewed pre procedure instructions; eat a light meal - npo 1 hour prior, , loose fitting clothing, cb if illness / abx start prior to procedure, continue to hold plavix until the 12/8 post procedure  Michell Ricci does not need to be held for this procedure  Follow instructions as discussed by Dr Tommy Del Rosario and Dr Yari Rico  pt verbalized understanding and appreciation  LMOM at [address] of above and requsting a copy of the written orders to hold asa / plavix prior to 12/8 colonoscopy  Provided cb number for questions / concerns and fax number  Ximena Deyvi - 06 Dec 2017 11:43 AM     TASK EDITED  [facility] calling back for patient, they are going fax something over regarding an okay for a hold   Marck Gaytan - 06 Dec 2017 11:47 AM     TASK EDITED  s/w Niki at Dr Barr Minium office  Will fax the [facility] for anticoag hold used for colonoscopy scheduled for 12/8 to [phone] for [facility] documentation  Marck Gaytan - 06 Dec 2017 11:48 AM     TASK EDITED        Active Problems    1  Carpal tunnel syndrome (354 0) (G56 00)   2  Chronic lumbar pain (724 2,338 29) (M54 5,G89 29)   3  Chronic lumbar radiculopathy (724 4) (M54 16)   4  Leg pain, bilateral (729 5) (M79 604,M79 605)   5  Lumbar herniated disc (722 10) (M51 26)   6  Lumbar spondylosis (721 3) (M47 816)   7  Pain syndrome, chronic (338 4) (G89 4)   8  Polymyalgia rheumatica (725) (M35 3)   9  Wrist pain, right (719 43) (M25 531)    Current Meds   1  Aleve CAPS; Therapy: (Recorded:21Dvh3104) to Recorded   2  Aspir-81 81 MG Oral Tablet Delayed Release Recorded   3  Atenolol 50 MG Oral Tablet; Therapy: (Recorded:16Anp0751) to Recorded   4  Calcium 250 MG Oral Capsule; Therapy: 49LFB3854 to Recorded   5  Co Q10 CAPS Recorded   6   Fluticasone Propionate 50 MCG/ACT Nasal Suspension; Therapy: 83CBC3553 to (Evaluate:80Hlt4058) Recorded   7  Folic Acid 1 MG Oral Tablet Recorded   8  Livalo 4 MG Oral Tablet; Therapy: (Recorded:19May2015) to Recorded   9  Magnesium 200 MG Oral Tablet; Therapy: 91ZBF3370 to Recorded   10  Multi-Vitamin/Iron Oral Tablet; Therapy: 47JXG6674 to Recorded   11  Omeprazole 20 MG Oral Capsule Delayed Release; Therapy: 80KSL4392 to ([phone]) Recorded   12  Plavix 75 MG Oral Tablet (Clopidogrel Bisulfate) Recorded   13  Ramipril 5 MG Oral Capsule; Therapy: 48WVA3578 to ([phone]) Recorded   14  RaNITidine HCl - 300 MG Oral Capsule; Therapy: (Recorded:19May2015) to Recorded   15  Synthroid 150 MCG Oral Tablet (Levothyroxine Sodium); Therapy: (Recorded:19May2015) to Recorded   16  Tylenol 325 MG Oral Tablet; Therapy: (Recorded:19May2015) to Recorded   17  Viagra 50 MG Oral Tablet; Therapy: 51Hpm0568 to (Evaluate:17Vue4857) Recorded   18  Zinc 100 MG Oral Tablet; Therapy: 22LTT1386 to Recorded    Allergies    1  Cardizem TABS   2   Niacin TABS   3  Statins    Signatures   Electronically signed by : Carolann Van, ; Dec  6 2017 11:49AM EST                       (Author)

## 2018-03-07 NOTE — PROCEDURES
Procedure    Procedure: Lumbar epidural steroid injection under fluoroscopic guidance  L5-S1 directed towards right  Diagnosis: Lumbar disc herniation, lumbar radiculitis with low back pain  Indication for Fluoroscopy: This procedure requires the precise placement of the Tuohy needle into the epidural space  It is the only way to accurately perform the injection  Medical Necessity: Failure of conservative management  Procedure Note: After fully informed written consent was obtained, the patient was brought into the Procedure Room and placed prone on the fluoroscopy table  A pillow was placed under the abdomen to lessen the lumbar lordosis  After Chloraprep and sterile drapes, C-arm fluoroscopy was utilized to identify the proper interspace  Using a 25 gauge needle 1% Lidocaine was used for local anesthetic  A 20 gauge Tuohy needle was then placed by loss of resistance technique on the first attempt  After negative aspiration for heme and CSF, Omnipaque 300 was injected confirming proper placement in the epidural space  AP and lateral projections were obtained  Again, after negative aspiration methylprednisolone diluted in preservative free saline was then injected  The needle was then withdrawn, the back cleansed and a band-aid placed  Complications: None  Disposition: Motor function was intact  Vital signs stable  The patient was discharged home with a   The discharge instruction sheet was given to the patient  The patient was discharged with instructions to call immediately if there are any complications        Signatures   Electronically signed by : Smitha Casiano DO; Oct  5 2017 10:49AM EST                       (Author)

## 2018-03-07 NOTE — PROCEDURES
Procedure    Place of Service: Procedure Room  Diagnosis:  Lumbar disc herniation lumbar radiculitis with low back pain     Procedure: Right L5 Transforaminal Epidural Injection under Fluoroscopy  Indication for Fluoroscopy: This procedure requires the precise placement of the spinal needle into the specific paravertebral foramen  The only way to accurately and safely perform the injection  Medical necessity: Failure of conservative medical management  Procedure Note: After fully informed consent was obtained, the patient was then positioned on the fluoroscopy table in the prone position with a pillow beneath the pelvis to reduce lumbar lordosis  The lumbar area was prepped with ChloraPrep and draped in the usual manner  The fluoroscope was used to identify the L3///L4///L5 vertebral body on the AP projection  It was then rotated into an oblique projection until the superior articulating process of the S1 (inferior) vertebra is projected beneath the 6 o'clock position of the L5 (superior) vertebrae  Using a 25 gauge needle, 1% lidocaine with bicarbonate was injected for local anesthetic  The 22 gauge 5 inch curved tip needle was inserted in the skin at a point overlying the superior articulating process of the inferior vertebra and aimed for the 6 o'clock position of the superior vertebrae's pedicle  After the needle contacted bone, a lateral projection was obtained to insure that the needle tip was in proximity with the vertebral body  Paresthesias were not noted  After gentle negative aspiration, Omnipaque 300 was injected under live fluoroscopy  Digital subtraction was utilized  No vascular uptake was noted  Following demonstration of the neurogram, 2% lidocaine was injected followed by Methylprednisolone  There was no CSF, paresthesiae nor heme identified  Disposition: The patient was brought to the recovery room in stable condition   Strength and sensation were tested in both lower extremities and were found to be intact  Vital signs were stable  The patient tolerated the procedure well  The patient was discharged home with a  and discharge instructions were reviewed and given to the patient        Signatures   Electronically signed by : Shana Rose DO; Dec 21 2017 10:02AM EST                       (Author)

## 2018-06-28 DIAGNOSIS — E83.42 HYPOMAGNESEMIA: ICD-10-CM

## 2018-06-28 DIAGNOSIS — R52 PAIN: ICD-10-CM

## 2018-06-28 LAB
ANION GAP SERPL CALC-SCNC: 10 MEQ/L (ref 3–15)
BUN SERPL-MCNC: 12 MG/DL (ref 8–20)
CALCIUM SERPL-MCNC: 9.1 MG/DL (ref 8.9–10.3)
CHLORIDE SERPL-SCNC: 99 MMOL/L (ref 98–109)
CO2 SERPL-SCNC: 26 MMOL/L (ref 22–32)
CREAT SERPL-MCNC: 0.9 MG/DL (ref 0.8–1.3)
ERYTHROCYTE [DISTWIDTH] IN BLOOD BY AUTOMATED COUNT: 13.1 % (ref 11.6–14.4)
GFR SERPL CREATININE-BSD FRML MDRD: >60 ML/MIN/1.73M*2
GLUCOSE SERPL-MCNC: 136 MG/DL (ref 70–99)
HCT VFR BLDCO AUTO: 36.1 % (ref 40–51)
HGB BLD-MCNC: 11.5 G/DL (ref 13.7–17.5)
MCH RBC QN AUTO: 28.8 PG (ref 28–33.2)
MCHC RBC AUTO-ENTMCNC: 31.9 G/DL (ref 32.2–36.5)
MCV RBC AUTO: 90.5 FL (ref 83–98)
PDW BLD AUTO: 10 FL (ref 9.4–12.4)
PLATELET # BLD AUTO: 289 K/UL (ref 150–350)
POTASSIUM SERPL-SCNC: 4.4 MMOL/L (ref 3.6–5.1)
RBC # BLD AUTO: 3.99 M/UL (ref 4.5–5.8)
SODIUM SERPL-SCNC: 135 MMOL/L (ref 136–144)
WBC # BLD AUTO: 5.84 K/UL (ref 3.8–10.5)

## 2018-06-28 PROCEDURE — 36415 COLL VENOUS BLD VENIPUNCTURE: CPT | Performed by: INTERNAL MEDICINE

## 2018-06-28 PROCEDURE — 85027 COMPLETE CBC AUTOMATED: CPT | Performed by: INTERNAL MEDICINE

## 2018-06-28 PROCEDURE — 84295 ASSAY OF SERUM SODIUM: CPT | Performed by: INTERNAL MEDICINE

## 2018-11-08 ENCOUNTER — TELEPHONE (OUTPATIENT)
Dept: PAIN MEDICINE | Facility: CLINIC | Age: 73
End: 2018-11-08

## 2018-11-08 ENCOUNTER — TELEPHONE (OUTPATIENT)
Dept: RADIOLOGY | Facility: CLINIC | Age: 73
End: 2018-11-08

## 2018-11-08 NOTE — TELEPHONE ENCOUNTER
Pt referred from Dr Loretta Franks for Epidural inj, pt last seen in office 11/9/2017, last inj was 12/21/2017  Pt recently had a new MRI done 10/22/18 @ [facility] in [place], office f/u w/Maury on 10/26/2018    Pt c/o chronic LBP affecting legs, driving in car more than 5 mins pain in legs increases  Pt had Rt knee replacement June 2018  Advised pt will need discuss with provider as with the new studies might need to have office re-eval prior  Pt is also on Plavix/ASA      I

## 2018-11-08 NOTE — TELEPHONE ENCOUNTER
m of 11/8/2018 @ [address] [facility] [address] calling  Rc: [phone]  Calling re: pt  Pt contacted [facility] stating that he is scheduled for an injection at the end of the week and questioned plavix hold  Per Blake Moy - is aware that a request for plavix hold is usually faxed from [facility] - no request has been received  Please advise / fax to [phone]    [facility] at [address] - pt is scheduled for an ov on 11/13 - no procedure and no request to hold plavix at this time  This office will fax a request for plavix hold if / when the need should arise  The writer will fu w/ the pt to confirm that the pt is continuing plavix as prescribed at this time  Provided direct # [phone]    S/w pt, confirmed ov on 11/13 - NOT an injection  Pt questioned plavix hold stating that per Dr Suni Aceves a 5 day hold is sufficient  Advised pt, different specialties require different hold times based on the risk associated  Please continue to take plavix as prescribed, this office will fu w/ Dr Suni Aceves re: plavix hold as appropriate  Pt verbalized understanding and stated that his is still taking his plavix as rx'd

## 2018-11-13 ENCOUNTER — TELEPHONE (OUTPATIENT)
Dept: RADIOLOGY | Facility: CLINIC | Age: 73
End: 2018-11-13

## 2018-11-13 ENCOUNTER — OFFICE VISIT (OUTPATIENT)
Dept: PAIN MEDICINE | Facility: CLINIC | Age: 73
End: 2018-11-13
Payer: MEDICARE

## 2018-11-13 VITALS
DIASTOLIC BLOOD PRESSURE: 70 MMHG | HEART RATE: 66 BPM | HEIGHT: 70 IN | SYSTOLIC BLOOD PRESSURE: 130 MMHG | BODY MASS INDEX: 30.49 KG/M2 | WEIGHT: 213 LBS

## 2018-11-13 DIAGNOSIS — M48.062 SPINAL STENOSIS OF LUMBAR REGION WITH NEUROGENIC CLAUDICATION: Primary | ICD-10-CM

## 2018-11-13 DIAGNOSIS — M54.16 LUMBAR RADICULOPATHY: ICD-10-CM

## 2018-11-13 PROCEDURE — 99214 OFFICE O/P EST MOD 30 MIN: CPT | Performed by: ANESTHESIOLOGY

## 2018-11-13 RX ORDER — OMEPRAZOLE 20 MG/1
20 CAPSULE, DELAYED RELEASE ORAL DAILY
Refills: 4 | COMMUNITY
Start: 2018-09-25

## 2018-11-13 RX ORDER — ACETAMINOPHEN 325 MG/1
TABLET ORAL
COMMUNITY

## 2018-11-13 RX ORDER — FLUTICASONE PROPIONATE 50 MCG
2 SPRAY, SUSPENSION (ML) NASAL DAILY
Refills: 3 | COMMUNITY
Start: 2018-11-06

## 2018-11-13 RX ORDER — SILDENAFIL 50 MG/1
TABLET, FILM COATED ORAL
COMMUNITY
Start: 2015-04-17

## 2018-11-13 RX ORDER — RANITIDINE 300 MG/1
CAPSULE ORAL
COMMUNITY

## 2018-11-13 RX ORDER — COVID-19 ANTIGEN TEST
KIT MISCELLANEOUS
COMMUNITY

## 2018-11-13 RX ORDER — LEVOTHYROXINE SODIUM 0.15 MG/1
150 TABLET ORAL DAILY
Refills: 0 | COMMUNITY
Start: 2018-09-07

## 2018-11-13 RX ORDER — CLOPIDOGREL BISULFATE 75 MG/1
75 TABLET ORAL DAILY
Refills: 1 | COMMUNITY
Start: 2018-09-07

## 2018-11-13 RX ORDER — ASPIRIN 81 MG/1
TABLET, CHEWABLE ORAL
COMMUNITY

## 2018-11-13 RX ORDER — DIMENHYDRINATE 50 MG
TABLET ORAL
COMMUNITY

## 2018-11-13 RX ORDER — FOLIC ACID 1 MG/1
TABLET ORAL
COMMUNITY

## 2018-11-13 RX ORDER — RAMIPRIL 5 MG/1
5 CAPSULE ORAL DAILY
Refills: 1 | COMMUNITY
Start: 2018-10-26

## 2018-11-13 RX ORDER — B-COMPLEX WITH VITAMIN C
TABLET ORAL
COMMUNITY
Start: 2017-07-18

## 2018-11-13 RX ORDER — ATENOLOL 50 MG/1
50 TABLET ORAL DAILY
Refills: 0 | COMMUNITY
Start: 2018-10-23

## 2018-11-13 NOTE — PROGRESS NOTES
Assessment:  1  Spinal stenosis of lumbar region with neurogenic claudication    2  Lumbar radiculopathy        Plan:    The patient's pain persists despite time, relative rest, activity modification and therapy  I believe that he would benefit from a lumbar epidural steroid injection to diminish any inflammatory component of his pain  I will initially use a transforaminal approach to better concentrate the steroid along the affected nerve root  The injection may need to be repeated based on the degree of pain relief following the initial injection  As the patient is on anticoagulation which is contraindicated in neuroaxial techniques, we will obtain permission if medically appropriate to hold the anticoagulation for an appropriate length of time  In the office today, we reviewed the nature of the patient's pathology in depth using  diagrams and models  I discussed the approach I would use for the epidural steroid injection and provided literature for home review  The patient understands the risks associated with the procedure including but not limited to bleeding, infection, tissue injury, exacerbation of symptoms, allergic reaction, spinal headache, and paralysis and provided written and verbal consent  Given the patient's history of diabetes, there may be an increased risk of infection in association with the procedure although the overall risk is low  In addition, following the injection there may be a transient elevation in serum glucose levels for several days  The patient understands that this may require additional glycemic coverage in coordination with the treating physician  My impressions and treatment recommendations were discussed in detail with the patient who verbalized understanding and had no further questions  Discharge instructions were provided  I personally saw and examined the patient and I agree with the above discussed plan of care      Orders Placed This Encounter Procedures    FL spine and pain procedure     Standing Status:   Future     Standing Expiration Date:   2022     Order Specific Question:   Reason for Exam:     Answer:   Right L4/L5 transforaminal epidural steroid injection 1  Order Specific Question:   Anticoagulant hold needed? Answer:   Yes-Plavix    FL spine and pain procedure     Standing Status:   Future     Standing Expiration Date:   2022     Order Specific Question:   Reason for Exam:     Answer:   Right L4/L5 transforaminal epidural steroid injection 2  Order Specific Question:   Anticoagulant hold needed?      Answer:   Yes-Plavix     New Medications Ordered This Visit   Medications    levothyroxine 150 mcg tablet     Sig: Take 150 mcg by mouth daily     Refill:  0    metFORMIN (GLUCOPHAGE) 500 mg tablet     Si TABLET TWICE A DAY BY MOUTH FOR 90 DAYS     Refill:  3    Multiple Vitamins-Iron (MULTI-VITAMIN/IRON PO)     Sig: Take by mouth    folic acid (FOLVITE) 1 mg tablet     Sig: Take by mouth    PRALUENT 75 MG/ML SOPN    aspirin 81 mg chewable tablet     Sig: Chew    Naproxen Sodium (ALEVE) 220 MG CAPS     Sig: Take by mouth    atenolol (TENORMIN) 50 mg tablet     Sig: Take 50 mg by mouth daily     Refill:  0    Calcium 250 MG CAPS     Sig: Take by mouth    clopidogrel (PLAVIX) 75 mg tablet     Sig: Take 75 mg by mouth daily     Refill:  1    coenzyme Q-10 100 MG capsule     Sig: Take by mouth    fluticasone (FLONASE) 50 mcg/act nasal spray     Si sprays daily     Refill:  3    Magnesium 200 MG CHEW     Sig: Chew    omeprazole (PriLOSEC) 20 mg delayed release capsule     Sig: Take 20 mg by mouth daily     Refill:  4    ramipril (ALTACE) 5 mg capsule     Sig: Take 5 mg by mouth daily     Refill:  1    ranitidine (ZANTAC) 300 MG capsule     Sig: Take by mouth    acetaminophen (TYLENOL) 325 mg tablet     Sig: Take by mouth    sildenafil (VIAGRA) 50 MG tablet     Sig: Take by mouth    Zinc 100 MG TABS Sig: Take by mouth       History of Present Illness:    Celio Diaz is a 75 y o  male who I saw approximately 6 months ago since then he underwent lumbar laminectomy as well as knee surgery  After his lumbar laminectomy he experienced approximately 6 months of relief then pain has returned right greater than left on the posterior lateral aspect of his leg  New MRI was performed he rates his pain being 8 to 9/10 on the visual analog scale throughout the day and constant described dull achy with a numbing sensation is worse when he is riding in a car or hard chairs his pain is significantly interfering with his daily living activities  Pain is worse with standing and walking relieved with sitting  I have personally reviewed and/or updated the patient's past medical history, past surgical history, family history, social history, current medications, allergies, and vital signs today  Review of Systems:    Review of Systems   Respiratory: Negative for shortness of breath  Cardiovascular: Negative for chest pain  Gastrointestinal: Negative for constipation, diarrhea, nausea and vomiting  Musculoskeletal: Positive for gait problem (Difficulrty walking, Decreaased ROM ) and joint swelling (joint stiffness)  Negative for arthralgias and myalgias  Skin: Negative for rash  Neurological: Negative for dizziness, seizures and weakness  All other systems reviewed and are negative  Patient Active Problem List   Diagnosis    Chronic lumbar pain    Chronic lumbar radiculopathy    Pain syndrome, chronic    Polymyalgia rheumatica ([address] )       History reviewed  No pertinent past medical history  Past Surgical History:   Procedure Laterality Date    BACK SURGERY      JOINT REPLACEMENT Right        History reviewed  No pertinent family history  Social History     Occupational History    Not on file       Social History Main Topics    Smoking status: Never Smoker    Smokeless tobacco: Never Used    Alcohol use No    Drug use: No    Sexual activity: Not Currently       No current outpatient prescriptions on file prior to visit  No current facility-administered medications on file prior to visit  Allergies   Allergen Reactions    Diltiazem     Niacin     Statins        Physical Exam:    /70   Pulse 66   Ht 5' 10" (1 778 m)   Wt 96 6 kg (213 lb)   BMI 30 56 kg/m²     Constitutional: normal, well developed, well nourished, alert, in no distress and non-toxic and no overt pain behavior  and overweight  Eyes: anicteric  HEENT: grossly intact  Neck: supple, symmetric, trachea midline and no masses   Pulmonary:even and unlabored  Cardiovascular:No edema or pitting edema present  Skin:Normal without rashes or lesions and well hydrated  Psychiatric:Mood and affect appropriate  Neurologic:Cranial Nerves II-XII grossly intact  Musculoskeletal:normal and stooped posture, difficulty going from sitting to standing to sitting position, well-healed surgical scar no other obvious skin lesions or erythema lumbar sacral spine, no tenderness to palpation lumbar sacral spine spinous process sacroiliac joint or greater trochanter bilateral, deep tendon reflexes diminished but symmetrical patella and Achilles, no focal motor deficit appreciated lower limbs, decreased sensation right L5 distribution to pinwheel, positive straight leg raise on the right negative on the left    Imaging  [facility]  Multilevel lumbar spondylosis as described  No pathologic enhancement or fluid collection      I have personally reviewed pertinent films in PACS

## 2018-11-13 NOTE — TELEPHONE ENCOUNTER
Faxed anticoag hold form to Dr Naveen Jones (Plavix)    Rt L4/5 TFESI X2    Please try calling both #'s [phone] & [phone]

## 2018-11-16 NOTE — TELEPHONE ENCOUNTER
S/w pt, advised of above  Scheduled procedure #1 on 11/30 and procedure #2 on 12/13  Reviewed pre procedure instructions: eat a light meal - npo 1 hour prior, , loose fitting clothing, cb if illness / abx start prior to procedure, no plavix 11/23 - 11/30 post procedure #1, no plavix 12/7/18 - 12/14/2018 post procedure #2  Pt questioned advil, aleve, tylenol  Advised pt, ok to continue those medications prior to tfesi  Please cb if questions or concerns arise  Pt verbalized understanding and appreciaiton

## 2018-11-30 ENCOUNTER — HOSPITAL ENCOUNTER (OUTPATIENT)
Dept: RADIOLOGY | Facility: CLINIC | Age: 73
Discharge: HOME | End: 2018-11-30
Attending: ANESTHESIOLOGY | Admitting: ANESTHESIOLOGY
Payer: MEDICARE

## 2018-11-30 VITALS
OXYGEN SATURATION: 93 % | TEMPERATURE: 97.8 F | DIASTOLIC BLOOD PRESSURE: 82 MMHG | SYSTOLIC BLOOD PRESSURE: 143 MMHG | HEART RATE: 72 BPM | RESPIRATION RATE: 20 BRPM

## 2018-11-30 DIAGNOSIS — M48.062 SPINAL STENOSIS OF LUMBAR REGION WITH NEUROGENIC CLAUDICATION: ICD-10-CM

## 2018-11-30 DIAGNOSIS — M54.16 LUMBAR RADICULOPATHY: ICD-10-CM

## 2018-11-30 PROCEDURE — 64483 NJX AA&/STRD TFRM EPI L/S 1: CPT | Performed by: ANESTHESIOLOGY

## 2018-11-30 PROCEDURE — 64484 NJX AA&/STRD TFRM EPI L/S EA: CPT | Performed by: ANESTHESIOLOGY

## 2018-11-30 RX ORDER — PAPAVERINE HCL 150 MG
20 CAPSULE, EXTENDED RELEASE ORAL ONCE
Status: COMPLETED | OUTPATIENT
Start: 2018-11-30 | End: 2018-11-30

## 2018-11-30 RX ORDER — LIDOCAINE HYDROCHLORIDE 10 MG/ML
5 INJECTION, SOLUTION EPIDURAL; INFILTRATION; INTRACAUDAL; PERINEURAL ONCE
Status: COMPLETED | OUTPATIENT
Start: 2018-11-30 | End: 2018-11-30

## 2018-11-30 RX ADMIN — LIDOCAINE HYDROCHLORIDE 2 ML: 20 INJECTION, SOLUTION EPIDURAL; INFILTRATION; INTRACAUDAL at 08:44

## 2018-11-30 RX ADMIN — IOHEXOL 1 ML: 300 INJECTION, SOLUTION INTRAVENOUS at 08:44

## 2018-11-30 RX ADMIN — LIDOCAINE HYDROCHLORIDE 3 ML: 10 INJECTION, SOLUTION EPIDURAL; INFILTRATION; INTRACAUDAL; PERINEURAL at 08:43

## 2018-11-30 RX ADMIN — DEXAMETHASONE SODIUM PHOSPHATE 20 MG: 10 INJECTION, SOLUTION INTRAMUSCULAR; INTRAVENOUS at 08:44

## 2018-11-30 NOTE — DISCHARGE INSTRUCTIONS
Epidural Steroid Injection   WHAT YOU NEED TO KNOW:   An epidural steroid injection (RICHARD) is a procedure to inject steroid medicine into the epidural space  The epidural space is between your spinal cord and vertebrae  Steroids reduce inflammation and fluid buildup in your spine that may be causing pain  You may be given pain medicine along with the steroids  ACTIVITY  · Do not drive or operate machinery today  · No strenuous activity today - bending, lifting, etc   · You may resume normal activites starting tomorrow - start slowly and as tolerated  · You may shower today, but no tub baths or hot tubs  · You may have numbness for several hours from the local anesthetic  Please use caution and common sense, especially with weight-bearing activities  CARE OF THE INJECTION SITE  · If you have soreness or pain, apply ice to the area today (20 minutes on/20 minutes off)  · Starting tomorrow, you may use warm, moist heat or ice if needed  · You may have an increase or change in your discomfort for 36-48 hours after your treatment  · Apply ice and continue with any pain medication you have been prescribed  · Notify [facility] if you have any of the following: redness, drainage, swelling, headache, stiff neck or fever above 100°F     SPECIAL INSTRUCTIONS  · Our office will contact you in approximately 7 days for a progress report  MEDICATIONS  · Continue to take all routine medications  · Our office may have instructed you to hold some medications  If you have a problem specifically related to your procedure, please call our office at [phone]  Problems not related to your procedure should be directed to your primary care physician

## 2018-11-30 NOTE — H&P
History of Present Illness: The patient is a 75 y o  male who presents with complaints of low back and leg pain  Patient Active Problem List   Diagnosis    Chronic lumbar pain    Chronic lumbar radiculopathy    Pain syndrome, chronic    Polymyalgia rheumatica ([facility])       No past medical history on file      Past Surgical History:   Procedure Laterality Date    BACK SURGERY      JOINT REPLACEMENT Right          Current Outpatient Prescriptions:     acetaminophen (TYLENOL) 325 mg tablet, Take by mouth, Disp: , Rfl:     aspirin 81 mg chewable tablet, Chew, Disp: , Rfl:     atenolol (TENORMIN) 50 mg tablet, Take 50 mg by mouth daily, Disp: , Rfl: 0    Calcium 250 MG CAPS, Take by mouth, Disp: , Rfl:     clopidogrel (PLAVIX) 75 mg tablet, Take 75 mg by mouth daily, Disp: , Rfl: 1    coenzyme Q-10 100 MG capsule, Take by mouth, Disp: , Rfl:     fluticasone (FLONASE) 50 mcg/act nasal spray, 2 sprays daily, Disp: , Rfl: 3    folic acid (FOLVITE) 1 mg tablet, Take by mouth, Disp: , Rfl:     levothyroxine 150 mcg tablet, Take 150 mcg by mouth daily, Disp: , Rfl: 0    Magnesium 200 MG CHEW, Chew, Disp: , Rfl:     metFORMIN (GLUCOPHAGE) 500 mg tablet, 1 TABLET TWICE A DAY BY MOUTH FOR 90 DAYS, Disp: , Rfl: 3    Multiple Vitamins-Iron (MULTI-VITAMIN/IRON PO), Take by mouth, Disp: , Rfl:     Naproxen Sodium (ALEVE) 220 MG CAPS, Take by mouth, Disp: , Rfl:     omeprazole (PriLOSEC) 20 mg delayed release capsule, Take 20 mg by mouth daily, Disp: , Rfl: 4    PRALUENT 75 MG/ML SOPN, , Disp: , Rfl:     ramipril (ALTACE) 5 mg capsule, Take 5 mg by mouth daily, Disp: , Rfl: 1    ranitidine (ZANTAC) 300 MG capsule, Take by mouth, Disp: , Rfl:     sildenafil (VIAGRA) 50 MG tablet, Take by mouth, Disp: , Rfl:     Zinc 100 MG TABS, Take by mouth, Disp: , Rfl:     Current Facility-Administered Medications:     dexamethasone (PF) (DECADRON) injection 20 mg, 20 mg, Epidural, Once, Hany Nicholson DO    iohexol (OMNIPAQUE) 300 mg/mL injection 50 mL, 50 mL, Epidural, Once, Hany Nicholson,     lidocaine (PF) (XYLOCAINE-MPF) 1 % injection 5 mL, 5 mL, Other, Once, Hany Nicholson, DO    lidocaine (PF) (XYLOCAINE-MPF) 2 % injection 5 mL, 5 mL, Epidural, Once, Hany Millerev, DO    Allergies   Allergen Reactions    Diltiazem     Niacin     Statins        Physical Exam:   Vitals:    11/30/18 0835   BP: 134/71   Pulse: 69   Resp: 20   Temp: 97 8 °F (36 6 °C)   SpO2: 95%     General: Awake, Alert, Oriented x 3, Mood and affect appropriate  Respiratory: Respirations even and unlabored  Cardiovascular: Peripheral pulses intact; no edema  Musculoskeletal Exam:  Decreased range of motion lumbar spine    ASA Score: II         Assessment:   1  Spinal stenosis of lumbar region with neurogenic claudication    2  Lumbar radiculopathy        Plan: Right L4/L5 transforaminal epidural steroid injection 1

## 2018-12-06 ENCOUNTER — TELEPHONE (OUTPATIENT)
Dept: PAIN MEDICINE | Facility: CLINIC | Age: 73
End: 2018-12-06

## 2018-12-06 NOTE — TELEPHONE ENCOUNTER
Telephone note   Reason for the call      Post Op F/u ED [place] with pt he states that he is feeling pretty good and that he drove for 2 hours with out pain or very little pain  His pain level today was a 2-3 wit 70% relief and would like to cancel the follow up injection for now as he will be talking to the surgeon about it and will call back if he needs to have another inj   Please cancel follow up injection     S/P RT L4/5 TFESI #1on date w/ED in [place]      Follow up injection RT L4/5 TFESI #2 on 12/14

## 2018-12-14 NOTE — TELEPHONE ENCOUNTER
Message was not relayed to  to cancel this procedure, can someone please make sure pt did not hold Plavix

## 2018-12-18 NOTE — TELEPHONE ENCOUNTER
Attempted to contact pt at home/ cell  Left a detailed message on machine as per release of info on file advising pt, following up r/t cancelled procedure  Calling to confirm pt did not hold plavix, and if so - has resumed plavix  Restated, please continue plavix as prescribed  Provided cb number and office hours for questions or concerns

## 2019-07-29 DIAGNOSIS — I10 ESSENTIAL (PRIMARY) HYPERTENSION: ICD-10-CM

## 2019-07-29 LAB
ANION GAP SERPL CALC-SCNC: 7 MEQ/L (ref 3–15)
BUN SERPL-MCNC: 13 MG/DL (ref 8–20)
CALCIUM SERPL-MCNC: 9 MG/DL (ref 8.9–10.3)
CHLORIDE SERPL-SCNC: 102 MEQ/L (ref 98–109)
CO2 SERPL-SCNC: 27 MEQ/L (ref 22–32)
CREAT SERPL-MCNC: 0.9 MG/DL
ERYTHROCYTE [DISTWIDTH] IN BLOOD BY AUTOMATED COUNT: 13.5 % (ref 11.6–14.4)
GFR SERPL CREATININE-BSD FRML MDRD: >60 ML/MIN/1.73M*2
GLUCOSE SERPL-MCNC: 122 MG/DL (ref 70–99)
HCT VFR BLDCO AUTO: 35.3 %
HGB BLD-MCNC: 11.6 G/DL
MCH RBC QN AUTO: 30.4 PG (ref 28–33.2)
MCHC RBC AUTO-ENTMCNC: 32.9 G/DL (ref 32.2–36.5)
MCV RBC AUTO: 92.7 FL (ref 83–98)
PDW BLD AUTO: 10.2 FL (ref 9.4–12.4)
PLATELET # BLD AUTO: 223 K/UL
POTASSIUM SERPL-SCNC: 4.7 MEQ/L (ref 3.6–5.1)
RBC # BLD AUTO: 3.81 M/UL (ref 4.5–5.8)
SODIUM SERPL-SCNC: 136 MEQ/L (ref 136–144)
WBC # BLD AUTO: 6.43 K/UL

## 2019-07-29 PROCEDURE — 36415 COLL VENOUS BLD VENIPUNCTURE: CPT | Performed by: INTERNAL MEDICINE

## 2019-07-29 PROCEDURE — 85027 COMPLETE CBC AUTOMATED: CPT | Performed by: INTERNAL MEDICINE

## 2019-07-29 PROCEDURE — 80048 BASIC METABOLIC PNL TOTAL CA: CPT | Performed by: INTERNAL MEDICINE

## 2021-01-31 ENCOUNTER — IMMUNIZATIONS (OUTPATIENT)
Dept: FAMILY MEDICINE CLINIC | Facility: HOSPITAL | Age: 76
End: 2021-01-31

## 2021-01-31 DIAGNOSIS — Z23 ENCOUNTER FOR IMMUNIZATION: Primary | ICD-10-CM

## 2021-01-31 PROCEDURE — 91300 SARS-COV-2 / COVID-19 MRNA VACCINE (PFIZER-BIONTECH) 30 MCG: CPT

## 2021-01-31 PROCEDURE — 0001A SARS-COV-2 / COVID-19 MRNA VACCINE (PFIZER-BIONTECH) 30 MCG: CPT

## 2021-02-19 ENCOUNTER — IMMUNIZATIONS (OUTPATIENT)
Dept: FAMILY MEDICINE CLINIC | Facility: HOSPITAL | Age: 76
End: 2021-02-19

## 2021-02-19 DIAGNOSIS — Z23 ENCOUNTER FOR IMMUNIZATION: Primary | ICD-10-CM

## 2021-02-19 PROCEDURE — 0002A SARS-COV-2 / COVID-19 MRNA VACCINE (PFIZER-BIONTECH) 30 MCG: CPT

## 2021-02-19 PROCEDURE — 91300 SARS-COV-2 / COVID-19 MRNA VACCINE (PFIZER-BIONTECH) 30 MCG: CPT

## 2025-08-15 ENCOUNTER — TELEPHONE (OUTPATIENT)
Age: 80
End: 2025-08-15